# Patient Record
Sex: FEMALE | Race: WHITE | Employment: UNEMPLOYED | URBAN - METROPOLITAN AREA
[De-identification: names, ages, dates, MRNs, and addresses within clinical notes are randomized per-mention and may not be internally consistent; named-entity substitution may affect disease eponyms.]

---

## 2017-06-14 NOTE — PATIENT DISCUSSION
1.  New PVD OS: Patient was cautioned to call our office immediately if they experience a substantial change in their symptoms such as an increase in floaters persistent flashes loss of visual field (may appear as a shadow or a curtain) or decrease in visual acuity as these may indicate a retinal tear or detachment. 2.  Myope-3.    RTN 3-4 weeks DF

## 2017-07-21 NOTE — PATIENT DISCUSSION
1.PVD OS: slightly better. Patient was cautioned to call our office immediately if they experience a substantial change in their symptoms such as an increase in floaters persistent flashes loss of visual field (may appear as a shadow or a curtain) or decrease in visual acuity as these may indicate a retinal tear or detachment. 2.  Myope-3. RTN  prn.  PT wants mrx in future.   4.  RTN 1yr CE

## 2017-08-10 NOTE — PATIENT DISCUSSION
1.  Refractive error- myope-  Rx change. Pt not correctable to 20/20. Pt thinks his right eye has always been weaker. 2.  Cataracts ou-  Undilated.   2.  RTN 6/18 CE

## 2017-08-17 PROBLEM — K85.90 ACUTE PANCREATITIS: Status: ACTIVE | Noted: 2017-08-17

## 2017-10-16 ENCOUNTER — ANESTHESIA (OUTPATIENT)
Dept: ENDOSCOPY | Age: 47
End: 2017-10-16
Payer: OTHER GOVERNMENT

## 2017-10-16 ENCOUNTER — HOSPITAL ENCOUNTER (OUTPATIENT)
Age: 47
Setting detail: OUTPATIENT SURGERY
Discharge: HOME | End: 2017-10-16
Attending: INTERNAL MEDICINE | Admitting: INTERNAL MEDICINE
Payer: OTHER GOVERNMENT

## 2017-10-16 ENCOUNTER — ANESTHESIA EVENT (OUTPATIENT)
Dept: ENDOSCOPY | Age: 47
End: 2017-10-16
Payer: OTHER GOVERNMENT

## 2017-10-16 VITALS
BODY MASS INDEX: 32.78 KG/M2 | HEIGHT: 64 IN | HEART RATE: 89 BPM | SYSTOLIC BLOOD PRESSURE: 104 MMHG | RESPIRATION RATE: 17 BRPM | OXYGEN SATURATION: 99 % | WEIGHT: 192 LBS | DIASTOLIC BLOOD PRESSURE: 65 MMHG | TEMPERATURE: 97.8 F

## 2017-10-16 PROBLEM — R11.2 NAUSEA AND VOMITING: Chronic | Status: ACTIVE | Noted: 2017-10-16

## 2017-10-16 LAB — GLUCOSE BLD STRIP.AUTO-MCNC: 139 MG/DL (ref 70–110)

## 2017-10-16 PROCEDURE — 76060000031 HC ANESTHESIA FIRST 0.5 HR: Performed by: INTERNAL MEDICINE

## 2017-10-16 PROCEDURE — 82962 GLUCOSE BLOOD TEST: CPT

## 2017-10-16 PROCEDURE — 88305 TISSUE EXAM BY PATHOLOGIST: CPT | Performed by: INTERNAL MEDICINE

## 2017-10-16 PROCEDURE — 76040000019: Performed by: INTERNAL MEDICINE

## 2017-10-16 PROCEDURE — 74011250636 HC RX REV CODE- 250/636

## 2017-10-16 PROCEDURE — 77030009426 HC FCPS BIOP ENDOSC BSC -B: Performed by: INTERNAL MEDICINE

## 2017-10-16 RX ORDER — SODIUM CHLORIDE, SODIUM LACTATE, POTASSIUM CHLORIDE, CALCIUM CHLORIDE 600; 310; 30; 20 MG/100ML; MG/100ML; MG/100ML; MG/100ML
INJECTION, SOLUTION INTRAVENOUS
Status: DISCONTINUED | OUTPATIENT
Start: 2017-10-16 | End: 2017-10-16 | Stop reason: HOSPADM

## 2017-10-16 RX ORDER — SODIUM CHLORIDE 0.9 % (FLUSH) 0.9 %
5-10 SYRINGE (ML) INJECTION AS NEEDED
Status: CANCELLED | OUTPATIENT
Start: 2017-10-16 | End: 2017-10-16

## 2017-10-16 RX ORDER — SODIUM CHLORIDE 0.9 % (FLUSH) 0.9 %
5-10 SYRINGE (ML) INJECTION EVERY 8 HOURS
Status: CANCELLED | OUTPATIENT
Start: 2017-10-16 | End: 2017-10-16

## 2017-10-16 RX ORDER — PROPOFOL 10 MG/ML
INJECTION, EMULSION INTRAVENOUS AS NEEDED
Status: DISCONTINUED | OUTPATIENT
Start: 2017-10-16 | End: 2017-10-16 | Stop reason: HOSPADM

## 2017-10-16 RX ORDER — SODIUM CHLORIDE 9 MG/ML
25 INJECTION, SOLUTION INTRAVENOUS CONTINUOUS
Status: CANCELLED | OUTPATIENT
Start: 2017-10-16 | End: 2017-10-16

## 2017-10-16 RX ADMIN — PROPOFOL 80 MG: 10 INJECTION, EMULSION INTRAVENOUS at 08:44

## 2017-10-16 RX ADMIN — PROPOFOL 30 MG: 10 INJECTION, EMULSION INTRAVENOUS at 08:45

## 2017-10-16 RX ADMIN — SODIUM CHLORIDE, SODIUM LACTATE, POTASSIUM CHLORIDE, CALCIUM CHLORIDE: 600; 310; 30; 20 INJECTION, SOLUTION INTRAVENOUS at 08:30

## 2017-10-16 RX ADMIN — PROPOFOL 30 MG: 10 INJECTION, EMULSION INTRAVENOUS at 08:47

## 2017-10-16 NOTE — ANESTHESIA POSTPROCEDURE EVALUATION
Post-Anesthesia Evaluation and Assessment    Patient: Cesilia Harding MRN: [id number]  SSN: [id number]    YOB: 1970  Age: 50 y.o. Sex: female       Cardiovascular Function/Vital Signs  Visit Vitals    BP 92/53    Pulse 96    Temp 36.6 °C (97.8 °F)    Resp 18    Ht 5' 4\" (1.626 m)    Wt 87.1 kg (192 lb)    SpO2 97%    BMI 32.96 kg/m2       Patient is status post MAC anesthesia for Procedure(s):  ESOPHAGOGASTRODUODENOSCOPY (EGD). Nausea/Vomiting: None    Postoperative hydration reviewed and adequate. Pain:  Pain Scale 1: Numeric (0 - 10) (10/16/17 0855)  Pain Intensity 1: 0 (10/16/17 0855)   none    Neurological Status: At baseline    Mental Status and Level of Consciousness: Arousable    Pulmonary Status:   O2 Device: Room air (10/16/17 0858)   Adequate oxygenation and airway patent    Complications related to anesthesia: None    Post-anesthesia assessment completed.  No concerns    Signed By: Bella Lazaro CRNA     October 16, 2017

## 2017-10-16 NOTE — IP AVS SNAPSHOT
[address] [address] Carlota Winters  
[phone] Patient: Veronika Grace MRN: [id number] JANAE:[birth year] You are allergic to the following Allergen Reactions Pcn (Penicillins) Anaphylaxis Biaxin (Clarithromycin) Other (comments) Abdominal pain, dark urine Brethine (Terbutaline) Other (comments) Severe haalucinations Lyrica (Pregabalin) Angioedema Skelaxin (Metaxalone) Other (comments) Makes her severely weak Statins-Hmg-Coa Reductase Inhibitors Other (comments) Pain muscle weakness Recent Documentation Height Weight BMI OB Status Smoking Status 1.626 m 87.1 kg 32.96 kg/m2 Hysterectomy Current Every Day Smoker Emergency Contacts Name Discharge Info Relation Home Work Mobile Juan Alberto Murdock DISCHARGE CAREGIVER [3] Spouse [3]   [phone] About your hospitalization You were admitted on:  October 16, 2017 [facility] You were discharged on:  October 16, 2017 Unit phone number:  [phone] Why you were hospitalized Your primary diagnosis was:  Not on File Your diagnoses also included:  Nausea And Vomiting Providers Seen During Your Hospitalizations Provider Role Specialty Primary office phone Rich Rosado MD Attending Provider Gastroenterology [phone] Your Primary Care Physician (PCP) Primary Care Physician Office Phone Office Fax Antonietta Sicard [phone] Follow-up Information Follow up With Details Comments Contact Info Anurag Wyatt MD   [facility] [address] [address] 
[phone] Juanita La MD In 4 weeks  7 [facility] 
[address] [address] [facility] [address] [phone] Current Discharge Medication List  
  
CONTINUE these medications which have NOT CHANGED Dose & Instructions Dispensing Information Comments Morning Noon Evening Bedtime  
 albuterol 2.5 mg /3 mL (0.083 %) nebulizer solution Commonly known as:  PROVENTIL VENTOLIN Your last dose was: Your next dose is: Take  by inhalation every four (4) hours as needed for Wheezing. Refills:  0  
     
   
   
   
  
 amitriptyline 25 mg tablet Commonly known as:  ELAVIL Your last dose was: Your next dose is:    
   
   
 Dose:  75 mg Take 75 mg by mouth nightly. Refills:  0  
     
   
   
   
  
 aspirin delayed-release 81 mg tablet Your last dose was: Your next dose is:    
   
   
 Dose:  81 mg Take 81 mg by mouth daily. Refills:  0  
     
   
   
   
  
 baclofen 10 mg tablet Commonly known as:  LIORESAL Your last dose was: Your next dose is:    
   
   
 Dose:  10 mg Take 10 mg by mouth three (3) times daily. Refills:  0  
     
   
   
   
  
 benzonatate 200 mg capsule Commonly known as:  TESSALON Your last dose was: Your next dose is:    
   
   
 Dose:  200 mg Take 200 mg by mouth three (3) times daily as needed for Cough. Refills:  0  
     
   
   
   
  
 biotin 2,500 mcg Tab Your last dose was: Your next dose is:    
   
   
 Dose:  40420 mcg Take 10,000 mcg by mouth two (2) times a day. Refills:  0  
     
   
   
   
  
 calcium-magnesium-zinc 333-133-5 mg Tab Your last dose was: Your next dose is: Take  by mouth two (2) times a day. Refills:  0  
     
   
   
   
  
 cholecalciferol (vitamin D3) 2,000 unit Tab Your last dose was: Your next dose is:    
   
   
 Dose:  2000 Int'l Units Take 2,000 Int'l Units by mouth two (2) times a day. Refills:  0  
     
   
   
   
  
 cinnamon bark 500 mg Cap Your last dose was:     
   
Your next dose is:    
   
   
 Dose:  1000 mg  
 Take 1,000 mg by mouth two (2) times a day. Refills:  0  
     
   
   
   
  
 clonazePAM 0.5 mg tablet Commonly known as:  Kelly Cole Your last dose was: Your next dose is:    
   
   
 Dose:  0.5 mg Take 0.5 mg by mouth three (3) times daily. Refills:  0  
     
   
   
   
  
 cranberry extract 450 mg Tab tablet Your last dose was: Your next dose is:    
   
   
 Dose:  8400 mg Take 8,400 mg by mouth two (2) times a day. Refills:  0  
     
   
   
   
  
 cyanocobalamin 1,000 mcg tablet Your last dose was: Your next dose is:    
   
   
 Dose:  1000 mcg Take 1,000 mcg by mouth daily. Refills:  0  
     
   
   
   
  
 famotidine 20 mg tablet Commonly known as:  PEPCID Your last dose was: Your next dose is:    
   
   
 Dose:  20 mg Take 20 mg by mouth two (2) times a day. Refills:  0 HumaLOG 100 unit/mL injection Generic drug:  insulin lispro Your last dose was: Your next dose is:    
   
   
 Dose:  5 Units 5 Units by SubCUTAneous route Before breakfast, lunch, and dinner. Indications: Gestational Diabetes Mellitus, for \"high blood sugar\" Refills:  0 HYDROcodone-acetaminophen  mg tablet Commonly known as:  Julaine Kava Your last dose was: Your next dose is:    
   
   
 Dose:  1 Tab Take 1 Tab by mouth every four (4) hours as needed. Max Daily Amount: 6 Tabs. Quantity:  30 Tab Refills:  0  
     
   
   
   
  
 LANTUS 100 unit/mL injection Generic drug:  insulin glargine Your last dose was: Your next dose is:    
   
   
 Dose:  20 Units 20 Units by SubCUTAneous route daily. Refills:  0  
     
   
   
   
  
 levothyroxine 75 mcg tablet Commonly known as:  SYNTHROID Your last dose was: Your next dose is: Take  by mouth Daily (before breakfast). Refills:  0 metFORMIN 500 mg tablet Commonly known as:  GLUCOPHAGE Your last dose was: Your next dose is:    
   
   
 Dose:  1000 mg Take 1,000 mg by mouth two (2) times daily (with meals). Refills:  0  
     
   
   
   
  
 montelukast 10 mg tablet Commonly known as:  SINGULAIR Your last dose was: Your next dose is:    
   
   
 Dose:  10 mg Take 10 mg by mouth daily. Refills:  0  
     
   
   
   
  
 perindopril 4 mg tablet Commonly known as:  Jacob Caulk Your last dose was: Your next dose is:    
   
   
 Dose:  4 mg Take 4 mg by mouth daily. Indications: HYPERTENSION Refills:  0  
     
   
   
   
  
 promethazine 25 mg tablet Commonly known as:  PHENERGAN Your last dose was: Your next dose is:    
   
   
 Dose:  25 mg Take 1 Tab by mouth every six (6) hours as needed for Nausea. Quantity:  25 Tab Refills:  1  
     
   
   
   
  
 pyridoxine (vitamin B6) 100 mg tablet Commonly known as:  VITAMIN B-6 Your last dose was: Your next dose is:    
   
   
 Dose:  100 mg Take 100 mg by mouth daily. Refills:  0  
     
   
   
   
  
 tiotropium 18 mcg inhalation capsule Commonly known as:  Hyacinth Duyen Your last dose was: Your next dose is:    
   
   
 Dose:  1 Cap Take 1 Cap by inhalation daily. Refills:  0 Venlafaxine 150 mg Tr24 Your last dose was: Your next dose is: Take  by mouth. Refills:  0 Discharge Instructions EGD DISCHARGE INSTRUCTIONS You have just had an examination of your esophagus (swallowing tube), stomach and duodenum (the first part of your small intestine) and of your colon (large intestine). The medication given to you during your procedure will be acting in your body for the next 12 hours, gradually wearing off.   Your face may be flushed, skin may be warm and sweaty, you might feel a little bloated or nauseated and sleepy. 1. For the next 12 hours you SHOULD NOT: 
 
a. Drive, operate any machinery. b. Drink alcohol. 
c. Engage in activities that require mental sharpness or manual dexterity such as cooking. d. Take any medications other than prescribed by a physician. 
e. Make any legal or financial decisions. 2. Call this office immediately, if: 
 
a. Onset of new or increase of elieser rectal bleeding. 
b. Fever > 101 
c. Increased abdominal pain Additional instructions: 
 
a. Diet as recommended 
b. Due to risk of dehydration after colon prep and sedation, avoid extended periods of time outdoors if the day is hot and humid. c. If biopsies were taken, you will receive a post card or telephone call with results of your biopsies and suggestion for your next colonoscopy. Please allow us at least 3 weeks to get back with you about your results. If we have not contacted you by the, please call us for results. # [phone] 
d. If you had polyp(s) removed DO NOT TAKE NSAIDS (Aspirin, Advil, Aleve, Naproxyn, Ibuprofen, etc) for 2 weeks. Tylenol is OK to use. DISCHARGE SUMMARY from Nurse The following personal items are in your possession at time of discharge: 
 
Dental Appliances: Uppers Visual Aid: None PATIENT INSTRUCTIONS: 
 
After general anesthesia or intravenous sedation, for 24 hours or while taking prescription Narcotics: · Limit your activities · Do not drive and operate hazardous machinery · Do not make important personal or business decisions · Do  not drink alcoholic beverages · If you have not urinated within 8 hours after discharge, please contact your surgeon on call. Report the following to your surgeon: 
· Excessive pain, swelling, redness or odor of or around the surgical area · Temperature over 100.5 · Nausea and vomiting lasting longer than 4 hours or if unable to take medications · Any signs of decreased circulation or nerve impairment to extremity: change in color, persistent  numbness, tingling, coldness or increase pain · Any questions What to do at Home: These are general instructions for a healthy lifestyle: No smoking/ No tobacco products/ Avoid exposure to second hand smoke Surgeon General's Warning:  Quitting smoking now greatly reduces serious risk to your health. Obesity, smoking, and sedentary lifestyle greatly increases your risk for illness A healthy diet, regular physical exercise & weight monitoring are important for maintaining a healthy lifestyle You may be retaining fluid if you have a history of heart failure or if you experience any of the following symptoms:  Weight gain of 3 pounds or more overnight or 5 pounds in a week, increased swelling in our hands or feet or shortness of breath while lying flat in bed. Please call your doctor as soon as you notice any of these symptoms; do not wait until your next office visit. Recognize signs and symptoms of STROKE: 
 
F-face looks uneven A-arms unable to move or move unevenly S-speech slurred or non-existent T-time-call 911 as soon as signs and symptoms begin-DO NOT go Back to bed or wait to see if you get better-TIME IS BRAIN. Warning Signs of HEART ATTACK Call 911 if you have these symptoms: 
? Chest discomfort. Most heart attacks involve discomfort in the center of the chest that lasts more than a few minutes, or that goes away and comes back. It can feel like uncomfortable pressure, squeezing, fullness, or pain. ? Discomfort in other areas of the upper body. Symptoms can include pain or discomfort in one or both arms, the back, neck, jaw, or stomach. ? Shortness of breath with or without chest discomfort. ? Other signs may include breaking out in a cold sweat, nausea, or lightheadedness. Don't wait more than five minutes to call [address]!  Fast action can save your life. Calling 911 is almost always the fastest way to get lifesaving treatment. Emergency Medical Services staff can begin treatment when they arrive  up to an hour sooner than if someone gets to the hospital by car. The discharge information has been reviewed with the patient. The patient verbalized understanding. Discharge medications reviewed with the patient and appropriate educational materials and side effects teaching were provided. Patient armband removed and given to patient to take home. Patient was informed of the privacy risks if armband lost or stolen Discharge Orders None Introducing [facility]! [employer] introduces [employer] patient portal. Now you can access parts of your medical record, email your doctor's office, and request medication refills online. 1. In your internet browser, go to [email or web address] 2. Click on the First Time User? Click Here link in the Sign In box. You will see the New Member Sign Up page. 3. Enter your [employer] Access Code exactly as it appears below. You will not need to use this code after youve completed the sign-up process. If you do not sign up before the expiration date, you must request a new code. · [employer] Access Code: [email or web address] Expires: 11/19/2017 11:00 AM 
 
4. Enter the last four digits of your Social Security Number (xxxx) and Date of Birth (mm/dd/yyyy) as indicated and click Submit. You will be taken to the next sign-up page. 5. Create a [employer] ID. This will be your [employer] login ID and cannot be changed, so think of one that is secure and easy to remember. 6. Create a [employer] password. You can change your password at any time. 7. Enter your Password Reset Question and Answer. This can be used at a later time if you forget your password. 8. Enter your e-mail address. You will receive e-mail notification when new information is available in [address] 9. Click Sign Up. You can now view and download portions of your medical record. 10. Click the Download Summary menu link to download a portable copy of your medical information. If you have questions, please visit the Frequently Asked Questions section of the [employer] website. Remember, [employer] is NOT to be used for urgent needs. For medical emergencies, dial 911. Now available from your iPhone and Android! General Information Please provide this summary of care documentation to your next provider. Patient Signature:  ____________________________________________________________ Date:  ____________________________________________________________  
  
José Sport Provider Signature:  ____________________________________________________________ Date:  ____________________________________________________________

## 2017-10-16 NOTE — H&P
Date of Surgery Update:  Gustavo Justices was seen and examined. History and physical has been reviewed. The patient has been examined.  There have been no significant clinical changes since the completion of the originally dated History and Physical.    Signed By: Sharda Weiner MD     October 16, 2017 8:22 AM

## 2017-10-16 NOTE — DISCHARGE INSTRUCTIONS
EGD DISCHARGE INSTRUCTIONS    You have just had an examination of your esophagus (swallowing tube), stomach and duodenum (the first part of your small intestine) and of your colon (large intestine). The medication given to you during your procedure will be acting in your body for the next 12 hours, gradually wearing off. Your face may be flushed, skin may be warm and sweaty, you might feel a little bloated or nauseated and sleepy. 1. For the next 12 hours you SHOULD NOT:    a. Drive, operate any machinery. b. Drink alcohol.  c. Engage in activities that require mental sharpness or manual dexterity such as cooking. d. Take any medications other than prescribed by a physician.  e. Make any legal or financial decisions. 2. Call this office immediately, if:    a. Onset of new or increase of elieser rectal bleeding.  b. Fever > 101  c. Increased abdominal pain    Additional instructions:    a. Diet as recommended  b. Due to risk of dehydration after colon prep and sedation, avoid extended periods of time outdoors if the day is hot and humid. c. If biopsies were taken, you will receive a post card or telephone call with results of your biopsies and suggestion for your next colonoscopy. Please allow us at least 3 weeks to get back with you about your results. If we have not contacted you by the, please call us for results. Ph# [phone]  d. If you had polyp(s) removed DO NOT TAKE NSAIDS (Aspirin, Advil, Aleve, Naproxyn, Ibuprofen, etc) for 2 weeks. Tylenol is OK to use.         DISCHARGE SUMMARY from Nurse    The following personal items are in your possession at time of discharge:    Dental Appliances: Uppers  Visual Aid: None                            PATIENT INSTRUCTIONS:    After general anesthesia or intravenous sedation, for 24 hours or while taking prescription Narcotics:  · Limit your activities  · Do not drive and operate hazardous machinery  · Do not make important personal or business decisions  · Do  not drink alcoholic beverages  · If you have not urinated within 8 hours after discharge, please contact your surgeon on call. Report the following to your surgeon:  · Excessive pain, swelling, redness or odor of or around the surgical area  · Temperature over 100.5  · Nausea and vomiting lasting longer than 4 hours or if unable to take medications  · Any signs of decreased circulation or nerve impairment to extremity: change in color, persistent  numbness, tingling, coldness or increase pain  · Any questions        What to do at Home:  These are general instructions for a healthy lifestyle:    No smoking/ No tobacco products/ Avoid exposure to second hand smoke    Surgeon General's Warning:  Quitting smoking now greatly reduces serious risk to your health. Obesity, smoking, and sedentary lifestyle greatly increases your risk for illness    A healthy diet, regular physical exercise & weight monitoring are important for maintaining a healthy lifestyle    You may be retaining fluid if you have a history of heart failure or if you experience any of the following symptoms:  Weight gain of 3 pounds or more overnight or 5 pounds in a week, increased swelling in our hands or feet or shortness of breath while lying flat in bed. Please call your doctor as soon as you notice any of these symptoms; do not wait until your next office visit. Recognize signs and symptoms of STROKE:    F-face looks uneven    A-arms unable to move or move unevenly    S-speech slurred or non-existent    T-time-call 911 as soon as signs and symptoms begin-DO NOT go       Back to bed or wait to see if you get better-TIME IS BRAIN. Warning Signs of HEART ATTACK     Call 911 if you have these symptoms:   Chest discomfort. Most heart attacks involve discomfort in the center of the chest that lasts more than a few minutes, or that goes away and comes back.  It can feel like uncomfortable pressure, squeezing, fullness, or pain.  Discomfort in other areas of the upper body. Symptoms can include pain or discomfort in one or both arms, the back, neck, jaw, or stomach.  Shortness of breath with or without chest discomfort.  Other signs may include breaking out in a cold sweat, nausea, or lightheadedness. Don't wait more than five minutes to call 911 - MINUTES MATTER! Fast action can save your life. Calling 911 is almost always the fastest way to get lifesaving treatment. Emergency Medical Services staff can begin treatment when they arrive -- up to an hour sooner than if someone gets to the hospital by car. The discharge information has been reviewed with the patient. The patient verbalized understanding. Discharge medications reviewed with the patient and appropriate educational materials and side effects teaching were provided. Patient armband removed and given to patient to take home.   Patient was informed of the privacy risks if armband lost or stolen

## 2017-10-16 NOTE — PROCEDURES
EGD Procedure Note    Patient: Cesilia Harding MRN: [id number]  SSN: [id number]    YOB: 1970  Age: 50 y.o. Sex: female      Date of Procedure: 10/16/2017      Procedures:  EGD :    HISTORY UPDATE: History and physical has been reviewed. The patient has been examined. There have been no significant clinical changes since the completion of the originally dated History and Physical.    INDICATION: Nausea, vomiting    PROCEDURE PERFORMED: EGD    ENDOSCOPIST: Roopa Hanson MD    ASSISTANT:  Endoscopy Technician-1: Angelica Callahan  Endoscopy RN-1: Edson Rothman RN    CLASSIFICATION OF PREOPERATIVE RISK: ASA 2 - Patient with mild systemic disease with no functional limitations    ANESTHESIA:  MAC anesthesia    ENDOSCOPE: [id number]                                                                                                              EXTENT OF EXAM: Second portion of the duodenum      DESCRIPTION OF PROCEDURE:   The procedure was discussed with the patient including purpose, risks, benefits and alternatives including but not limited to IV conscious sedation, bleeding, perforation and aspiration and the consent form was signed and witnessed. A safety timeout was performed. Procedure done with MAC. The patients vital signs were monitored at all times including heart rate and rhythm, oxygen saturation, and blood pressure. The patient was then placed into the left lateral decubitus position. The Olympus adult diagnostic endoscope was then passed under direct visualization to the second portion of the duodenum. The endoscope was then slowly withdrawn while closely visualizing the mucosa. In the stomach a retroflexion was performed and gastric fundus and cardia visualized. The scope was then removed. The patient was then transferred to the recovery room. FINDINGS:   Esophagus: The esophageal mucosa was normal with no ulceration, mass or stricture.   There was no evidence of Travis's esophagus or reflux esophagitis. Z line at 38 cm. Stomach: The gastric mucosa showed  no ulceration, mass, stricture. Diffuse gastritis seen with some bile reflux, biopsies done to r/o H. Pylori. Retroflexion revealed a normal cardia and fundus      Duodenum: The duodenum mucosa was normal with no ulceration, mass,  stricture and no evidence of villous atrophy. EBL: None      SPECIMENS:   ID Type Source Tests Collected by Time Destination   1 : bx gastric r/o Hpylori Preservative Gastric  Loura Gibran La MD 10/16/2017 4881 Pathology       IMPRESSION: Diffuse gastritis seen with some bile reflux, biopsies done to r/o H. Pylori    PLAN 1: Discharge when sedation criteria are met. 2.  Resume regular Diet as tolerated. 3. Further recommendations will be made based on biopsy results.  4. Avoid NSAID use     Follow Up:  As scheduled     Meg Ziegler MD  10/16/2017

## 2017-10-16 NOTE — ANESTHESIA PREPROCEDURE EVALUATION
Anesthetic History   No history of anesthetic complications            Review of Systems / Medical History  Patient summary reviewed and pertinent labs reviewed    Pulmonary    COPD      Smoker  Asthma : well controlled       Neuro/Psych   Within defined limits           Cardiovascular    Hypertension: well controlled              Exercise tolerance: >4 METS  Comments: Aneurysm between chambers of the heart   GI/Hepatic/Renal  Within defined limits              Endo/Other    Diabetes: type 2, using insulin  Hypothyroidism       Other Findings   Comments:   Risk Factors for Postoperative nausea/vomiting:       History of postoperative nausea/vomiting? NO       Female? YES       Motion sickness? NO       Intended opioid administration for postoperative analgesia? NO      Smoking Abstinence  Current Smoker? YES  Elective Surgery? YES  Seen preoperatively by anesthesiologist or proxy prior to day of surgery? YES  Pt abstained from smoking 24 hours prior to anesthesia?  NO           Physical Exam    Airway  Mallampati: II  TM Distance: 4 - 6 cm  Neck ROM: normal range of motion   Mouth opening: Normal     Cardiovascular  Regular rate and rhythm,  S1 and S2 normal,  no murmur, click, rub, or gallop  Rhythm: regular  Rate: normal         Dental    Dentition: Poor dentition     Pulmonary  Breath sounds clear to auscultation               Abdominal  GI exam deferred       Other Findings            Anesthetic Plan    ASA: 3  Anesthesia type: MAC          Induction: Intravenous  Anesthetic plan and risks discussed with: Patient

## 2018-08-02 PROBLEM — E11.3293: Noted: 2018-08-02

## 2018-08-02 PROBLEM — H25.13: Noted: 2020-01-14

## 2018-08-02 PROBLEM — H04.123: Noted: 2020-01-14

## 2019-11-07 ENCOUNTER — IMPORTED ENCOUNTER (OUTPATIENT)
Dept: URBAN - NONMETROPOLITAN AREA CLINIC 1 | Facility: CLINIC | Age: 49
End: 2019-11-07

## 2019-11-07 PROCEDURE — 92014 COMPRE OPH EXAM EST PT 1/>: CPT

## 2019-11-07 PROCEDURE — 92134 CPTRZ OPH DX IMG PST SGM RTA: CPT

## 2019-11-07 PROCEDURE — 92015 DETERMINE REFRACTIVE STATE: CPT

## 2019-11-07 NOTE — PATIENT DISCUSSION
*Diabetic  with mild NPDR:.-  Discussed findings of exam in detail with the patient. -  discussed the risk of diabetic damage of the retina with potential vision loss and the importance of routine follow-up. oct today/stable ouAstigmatism-Discussed diagnosis with patient. Presbyopia-Discussed diagnosis with patient. Updated spec Rx given. Recommend lens that will provide comfort as well as protect safety and health of eyes.

## 2020-01-14 ENCOUNTER — IMPORTED ENCOUNTER (OUTPATIENT)
Dept: URBAN - NONMETROPOLITAN AREA CLINIC 1 | Facility: CLINIC | Age: 50
End: 2020-01-14

## 2020-01-14 PROCEDURE — 92014 COMPRE OPH EXAM EST PT 1/>: CPT

## 2020-01-14 NOTE — PATIENT DISCUSSION
*Diabetic  with mild NPDR:.-  Discussed findings of exam in detail with the patient. -  discussed the risk of diabetic damage of the retina with potential vision loss and the importance of routine follow-up. Cataract OU-Not yet surgical. -Reviewed symptoms of advancing cataract growth such as glare and halos and decreased vision.-Continue to monitor for now. Pt will notify us if any new symptoms develop. CONSIDER REFERRAL IF NO IMPROVEMENT ON F/UDES-Explained LYNNE and associated symptoms.-Recommend increasing Omega 3s.-Pt to begin artificial tears OU QID PRN. Pt will contact us if this does not provide relief. Consider punctal plugs in that case. START PF BID OU RTC 1MON F/U AND RE-REFRACT DUE TO LARGE CHANGE IN RX TODAY

## 2020-01-20 ENCOUNTER — IMPORTED ENCOUNTER (OUTPATIENT)
Dept: URBAN - NONMETROPOLITAN AREA CLINIC 1 | Facility: CLINIC | Age: 50
End: 2020-01-20

## 2020-01-20 PROCEDURE — 92014 COMPRE OPH EXAM EST PT 1/>: CPT

## 2020-01-20 NOTE — PATIENT DISCUSSION
*Diabetic  with mild NPDR:.-  Discussed findings of exam in detail with the patient. -  discussed the risk of diabetic damage of the retina with potential vision loss and the importance of routine follow-up. LYNNE-Explained LYNNE and associated symptoms.-Recommend increasing Omega 3s.-Pt to begin artificial tears OU QID PRN. Pt will contact us if this does not provide relief. Consider punctal plugs in that case. START PF BID OU Cataract(s)-Visually significant.-Cataract(s) causing symptomatic impairment of visual function not correctable with a tolerable change in glasses or contact lenses lighting or non-operative means resulting in specific activity limitations and/or participation restrictions including but not limited to reading viewing television driving or meeting vocational or recreational needs. -Expectation is clearer vision and reduced glare disability after cataract removal.-Refer to Dr Salma Fernandez at [facility] for cat eval

## 2020-02-17 ENCOUNTER — IMPORTED ENCOUNTER (OUTPATIENT)
Dept: URBAN - NONMETROPOLITAN AREA CLINIC 1 | Facility: CLINIC | Age: 50
End: 2020-02-17

## 2020-02-17 PROBLEM — E11.3293: Noted: 2020-02-17

## 2020-02-17 PROBLEM — H25.13: Noted: 2020-02-17

## 2020-02-17 PROBLEM — H04.123: Noted: 2020-01-14

## 2020-02-17 PROCEDURE — 92014 COMPRE OPH EXAM EST PT 1/>: CPT

## 2020-02-17 PROCEDURE — 92015 DETERMINE REFRACTIVE STATE: CPT

## 2020-02-17 NOTE — PATIENT DISCUSSION
Cataract OU-Not yet surgical. -Reviewed symptoms of advancing cataract growth such as glare and halos and decreased vision.-Continue to monitor for now. Pt will notify us if any new symptoms develop. DR. David Fernandez REFERRED TO [facility]URO FOR CONSULT

## 2021-03-05 ENCOUNTER — IMPORTED ENCOUNTER (OUTPATIENT)
Dept: URBAN - NONMETROPOLITAN AREA CLINIC 1 | Facility: CLINIC | Age: 51
End: 2021-03-05

## 2021-03-05 PROCEDURE — 92015 DETERMINE REFRACTIVE STATE: CPT

## 2021-03-05 PROCEDURE — 92014 COMPRE OPH EXAM EST PT 1/>: CPT

## 2021-03-05 NOTE — PATIENT DISCUSSION
Cataract OU-Not yet surgical. -Reviewed symptoms of advancing cataract growth such as glare and halos and decreased vision.-Continue to monitor for now. Pt will notify us if any new symptoms develop. DM s -Stressed the importance of keeping blood sugars under control blood pressure under control and weight normalization and regular visits with PCP. -Explained the possible effects of poorly controlled diabetes and the damage that diabetes can cause to ocular health. -Patient to check HgbA1C.-Pt instructed to contact our office with any vision changes. LYNNE-Explained LYNNE and associated symptoms.-Recommend increasing Omega 3s.-Pt to begin artificial tears OU QID PRN. Pt will contact us if this does not provide relief. Consider punctal plugs in that case.

## 2021-06-17 NOTE — PATIENT DISCUSSION
Toric IOL was discussed as needed for full correction of astigmatism. Discussed positioning and rotational stability, along with possibility of requiring rotation of the lens.

## 2021-06-17 NOTE — PATIENT DISCUSSION
The patient feels that the cataract is significantly impacting daily activities and has elected cataract surgery. The risks, benefits, and alternatives to surgery were discussed. The patient elects to proceed with surgery. Schedule A scan.

## 2021-07-15 NOTE — PATIENT DISCUSSION
The types of intraocular lenses were reviewed with the patient along with a discussion of their various strengths and weaknesses. 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 Recommend TORIC IOLs.

## 2021-07-15 NOTE — PATIENT DISCUSSION
I called and discussed in detail.    Urine culture did show infection so sent in prescription for amox.    Follow up with the ob provider.     Other labs are okay.  Mildly anemic but now on prenatal vitamin.    Ben Aponte MD     The risks, benefits and alternatives of cataract surgery were discussed with the patient. Risks including but not limited to: Infection, retinal detachment, lens dislocation, inflammation, loss of vision, increased pressure and need for further surgery. We discussed all the lens options including monofocal lens, toric lens, multifocal lens, astigmatism correction and other options. The patient understands that they may need glasses for optimal vision with any option.

## 2021-07-15 NOTE — PATIENT DISCUSSION
The types of intraocular lenses were reviewed with the patient along with a discussion of their various strengths and weaknesses. MFIOLs not recommended due to cornea.

## 2021-10-04 NOTE — PATIENT DISCUSSION
Toric IOL was discussed as needed for full correction of astigmatism. Discussed positioning and rotational stability, along with possibility of requiring rotation of the lens. Consent (Near Eyelid Margin)/Introductory Paragraph: The rationale for Mohs was explained to the patient and consent was obtained. The risks, benefits and alternatives to therapy were discussed in detail. Specifically, the risks of ectropion or eyelid deformity, infection, scarring, bleeding, prolonged wound healing, incomplete removal, allergy to anesthesia, nerve injury and recurrence were addressed. Prior to the procedure, the treatment site was clearly identified and confirmed by the patient. All components of Universal Protocol/PAUSE Rule completed.

## 2021-10-19 NOTE — PATIENT DISCUSSION
10/19/21: [address] BID AND PROLENSA. CPM BY  [facility]. CME HAS RESOLVED, ONLY SUBCLINICAL APPEARANCE ON FA. Recommend TO CONT TOPICAL NON-STEROID ANTI-INFLAMMATORY therapy. F/U IN 1 MO OR SOONER PRN.

## 2021-11-12 ENCOUNTER — OFFICE VISIT (OUTPATIENT)
Dept: ORTHOPEDIC SURGERY | Age: 51
End: 2021-11-12
Payer: OTHER GOVERNMENT

## 2021-11-12 VITALS — BODY MASS INDEX: 36.32 KG/M2 | OXYGEN SATURATION: 96 % | HEIGHT: 63 IN | WEIGHT: 205 LBS | HEART RATE: 111 BPM

## 2021-11-12 DIAGNOSIS — G89.29 CHRONIC PAIN OF RIGHT KNEE: ICD-10-CM

## 2021-11-12 DIAGNOSIS — M84.451A SUBCHONDRAL INSUFFICIENCY FRACTURE OF CONDYLE OF RIGHT FEMUR, INITIAL ENCOUNTER (HCC): ICD-10-CM

## 2021-11-12 DIAGNOSIS — M25.561 CHRONIC PAIN OF RIGHT KNEE: ICD-10-CM

## 2021-11-12 DIAGNOSIS — M17.11 PRIMARY OSTEOARTHRITIS OF RIGHT KNEE: Primary | ICD-10-CM

## 2021-11-12 PROCEDURE — 20610 DRAIN/INJ JOINT/BURSA W/O US: CPT | Performed by: SPECIALIST

## 2021-11-12 PROCEDURE — 99213 OFFICE O/P EST LOW 20 MIN: CPT | Performed by: SPECIALIST

## 2021-11-12 RX ORDER — BETAMETHASONE SODIUM PHOSPHATE AND BETAMETHASONE ACETATE 3; 3 MG/ML; MG/ML
3 INJECTION, SUSPENSION INTRA-ARTICULAR; INTRALESIONAL; INTRAMUSCULAR; SOFT TISSUE ONCE
Status: COMPLETED | OUTPATIENT
Start: 2021-11-12 | End: 2021-11-12

## 2021-11-12 RX ADMIN — BETAMETHASONE SODIUM PHOSPHATE AND BETAMETHASONE ACETATE 3 MG: 3; 3 INJECTION, SUSPENSION INTRA-ARTICULAR; INTRALESIONAL; INTRAMUSCULAR; SOFT TISSUE at 15:37

## 2021-11-12 NOTE — PROGRESS NOTES
Patient: Cherylene Severs                MRN: [id number]       SSN: [id number]  YOB: 1970        AGE: 46 y.o. SEX: female    PCP: Pastor Rigoberto MD  11/12/21    CC: BILATERAL KNEE PAIN R>L    HISTORY:  Cherylene Severs is a 46 y.o. female who is seen for mostly right knee pain. She was previously seen by Dr. Watson and Dr. Waqar Burciaga. She had a MRI but she says Dr. Waqar Burciaga never called her about the results and told her there was nothing wrong with her knee. She now presents for a second orthopedic opinion. She has been experiencing right knee pain for the past several months. She felt like her knee buckled on her last night which caused her to fall to the ground. She feels excruciating pain when she tries bearing weight. She feels pain with standing, walking and stair climbing. She experiences startup pain after sitting. She say's she can't walk due to her pain and Parkinson's disease. She has a h/o Parkinson's. She says she has trouble maintaining her balance. Pain Assessment  11/12/2021   Location of Pain Knee   Location Modifiers Right   Severity of Pain 10   Quality of Pain Aching; Sharp;Burning   Frequency of Pain Constant   Date Pain First Started (No Data)   Date Pain First Started Comment 9/21   Aggravating Factors Bending;Stretching;Straightening;Walking;Standing   Limiting Behavior Yes   Relieving Factors Elevation; Ice   Result of Injury No     Occupation, etc:  Ms. Joshua Clemons is not employed. She is a housewife. She worked a  and  a long time ago. She has to take care of her grandson. She has to drive him 40 miles to his private school in [place] everyday. She lives with her  in [place]. She has 3 daughters and 4 grandsons (4-5 yo). Ms. Joshua Clemons weighs 205 lbs and is 5'3\" tall. She has lost 100 pounds on diabetic treatment. She is an insulin dependent diabetic.     Lab Results   Component Value Date/Time    Hemoglobin A1c 7.9 (H) 05/15/2018 07:19 PM     Weight Metrics 11/12/2021 6/24/2019 12/28/2018 10/16/2017 8/17/2017   Weight 205 lb 205 lb 205 lb 192 lb 200 lb   BMI 36.31 kg/m2 36.31 kg/m2 36.31 kg/m2 32.96 kg/m2 35.43 kg/m2       Patient Active Problem List   Diagnosis Code    Acute pancreatitis K85.90    Nausea and vomiting R11.2     REVIEW OF SYSTEMS:    Constitutional Symptoms: Negative   Eyes: Negative   Ears, Nose, Throat and Mouth: Negative   Cardiovascular: Negative   Respiratory: Negative   Genitourinary: Per HPI   Gastrointestinal: Per HPI   Integumentary (Skin and/or Breast): Negative   Musculoskeletal: Per HPI   Endocrine/Rheumatologic: Negative   Neurological: Per HPI   Hematology/Lymphatic: Negative    Allergic/Immunologic: Negative   Phychiatric: Negative    Social History     Socioeconomic History    Marital status:      Spouse name: Not on file    Number of children: Not on file    Years of education: Not on file    Highest education level: Not on file   Occupational History    Not on file   Tobacco Use    Smoking status: Current Every Day Smoker     Packs/day: 0.50    Smokeless tobacco: Never Used   Substance and Sexual Activity    Alcohol use: No    Drug use: No    Sexual activity: Not on file   Other Topics Concern    Not on file   Social History Narrative    Not on file     Social Determinants of Health     Financial Resource Strain:     Difficulty of Paying Living Expenses: Not on file   Food Insecurity:     Worried About Running Out of Food in the Last Year: Not on file    Gab of Food in the Last Year: Not on file   Transportation Needs:     Lack of Transportation (Medical): Not on file    Lack of Transportation (Non-Medical):  Not on file   Physical Activity:     Days of Exercise per Week: Not on file    Minutes of Exercise per Session: Not on file   Stress:     Feeling of Stress : Not on file   Social Connections:     Frequency of Communication with Friends and Family: Not on file    Frequency of Social Gatherings with Friends and Family: Not on file    Attends Protestant Services: Not on file    Active Member of Clubs or Organizations: Not on file    Attends Club or Organization Meetings: Not on file    Marital Status: Not on file   Intimate Partner Violence:     Fear of Current or Ex-Partner: Not on file    Emotionally Abused: Not on file    Physically Abused: Not on file    Sexually Abused: Not on file   Housing Stability:     Unable to Pay for Housing in the Last Year: Not on file    Number of [place] in the Last Year: Not on file    Unstable Housing in the Last Year: Not on file      Allergies   Allergen Reactions    Pcn [Penicillins] Anaphylaxis    Biaxin [Clarithromycin] Other (comments)     Abdominal pain, dark urine    Brethine [Terbutaline] Other (comments)     Severe haalucinations    Gabapentin Other (comments)     Sates made nerve pain excruciating        Lyrica [Pregabalin] Angioedema    Skelaxin [Metaxalone] Other (comments)     Makes her severely weak      Statins-Hmg-Coa Reductase Inhibitors Other (comments)     Pain muscle weakness        Current Outpatient Medications   Medication Sig    omeprazole (PRILOSEC) 40 mg capsule Take 40 mg by mouth daily.  dicyclomine (BENTYL) 10 mg capsule Take 10 mg by mouth 4 times daily (before meals and nightly).  pramipexole (MIRAPEX) 0.25 mg tablet Take 0.5 mg by mouth three (3) times daily.  traZODone (DESYREL) 50 mg tablet Take 100 mg by mouth nightly.  valACYclovir (Valtrex) 1 gram tablet Take  by mouth.  augmented betamethasone dipropionate (DIPROLENE-AF) 0.05 % ointment Apply  to affected area two (2) times a day.  sucralfate (Carafate) 100 mg/mL suspension Take 5 mL by mouth four (4) times daily.  methocarbamoL (Robaxin) 500 mg tablet Take 1 Tab by mouth four (4) times daily.  As needed for muscle pain or spasm    promethazine (PHENERGAN) 25 mg tablet Take 1 Tab by mouth every six (6) hours as needed for Nausea for up to 12 doses.  hydroxychloroquine (PLAQUENIL) 200 mg tablet Take 200 mg by mouth two (2) times a day.  insulin glargine (LANTUS) 100 unit/mL injection 30 Units by SubCUTAneous route daily.  insulin lispro (HUMALOG) 100 unit/mL injection 5 Units by SubCUTAneous route Before breakfast, lunch, and dinner. Indications: Gestational Diabetes Mellitus, for \"high blood sugar\"    metFORMIN (GLUCOPHAGE) 500 mg tablet Take 1,000 mg by mouth two (2) times daily (with meals).  levothyroxine (SYNTHROID) 75 mcg tablet Take  by mouth Daily (before breakfast).  perindopril (ACEON) 4 mg tablet Take 4 mg by mouth daily. Indications: HYPERTENSION    amitriptyline (ELAVIL) 25 mg tablet Take 75 mg by mouth nightly.  montelukast (SINGULAIR) 10 mg tablet Take 10 mg by mouth daily.  baclofen (LIORESAL) 10 mg tablet Take 10 mg by mouth three (3) times daily.  Venlafaxine 150 mg tr24 Take  by mouth.  benzonatate (TESSALON) 200 mg capsule Take 200 mg by mouth three (3) times daily as needed for Cough.  tiotropium (SPIRIVA) 18 mcg inhalation capsule Take 1 Cap by inhalation daily.  albuterol (PROVENTIL VENTOLIN) 2.5 mg /3 mL (0.083 %) nebulizer solution Take  by inhalation every four (4) hours as needed for Wheezing.  clonazePAM (KLONOPIN) 0.5 mg tablet Take 0.5 mg by mouth three (3) times daily.  cholecalciferol, vitamin D3, 2,000 unit tab Take 5,000 Int'l Units by mouth two (2) times a day.  biotin 2,500 mcg tab Take 10,000 mcg by mouth two (2) times a day.  cranberry extract (AZO CRANBERRY) 450 mg tab Take 8,400 mg by mouth two (2) times a day.  pyridoxine, vitamin B6, (VITAMIN B-6) 100 mg tablet Take 100 mg by mouth daily.  cinnamon bark 500 mg cap Take 1,000 mg by mouth two (2) times a day.  cyanocobalamin 1,000 mcg tablet Take 1,000 mcg by mouth daily.  calcium-magnesium-zinc 333-133-5 mg tab Take  by mouth two (2) times a day.      No current facility-administered medications for this visit. PHYSICAL EXAMINATION:  Visit Vitals  Pulse (!) 111   Ht 5' 3\" (1.6 m)   Wt 205 lb (93 kg)   SpO2 96%   BMI 36.31 kg/m²      ORTHO EXAMINATION:  Examination Right knee Left knee   Skin Intact Intact   Range of motion 95-5 120-0   Effusion - -   Medial joint line tenderness + -   Lateral joint line tenderness - -   Popliteal tenderness - -   Osteophytes palpable + +   Ges - -   Patella crepitus + -   Anterior drawer - -   Lateral laxity - -   Medial laxity - -   Varus deformity - -   Valgus deformity - -   Pretibial edema - -   Calf tenderness - -      using rollator walker    TIME OUT:  Chart reviewed for the following:   I, Tina Fernando MD, have reviewed the History, Physical and updated the Allergic reactions for [facility] performed immediately prior to start of procedure:  Daniella Abel MD, have performed the following reviews on [facility] prior to the start of the procedure:          * Patient was identified by name and date of birth   * Agreement on procedure being performed was verified  * Risks and Benefits explained to the patient  * Procedure site verified and marked as necessary  * Patient was positioned for comfort  * Consent was obtained     Time: 3:24 PM     Date of procedure: 11/12/2021  Procedure performed by:  Tina Fernando MD  Ms. Murdock tolerated the procedure well with no complications. MRI RIGHT KNEE 10/8/21  Impression  Performed by RADIOLOGY  No ligamentous or meniscal injury identified. Suspect small subchondral insufficiency fracture at the medial margin of the medial femoral condyle posteriorly with intense subcortical marrow edema. Mild/moderate tricompartmental chondrosis, most advanced at the weightbearing medial and lateral compartments. No significant joint effusion.          RADIOGRAPHS:  XR RIGHT KNEE 6/19/18 [facility]  IMPRESSION:  There is tricompartmental joint space narrowing most notable in the medial compartment. No joint effusion. Small amount of enthesopathy seen at the superior patella. No erosions identified.  -I have independently reviewed these images during this office visit. -Dr. Miguelangel Nogueira:  Three views with bilateral knees on AP view - No fractures, no effusion, moderate medial joint space narrowing, no osteophytes present. Kellgren You grade 2.       IMPRESSION:      ICD-10-CM ICD-9-CM    1. Primary osteoarthritis of right knee  M17.11 715.16 PROCEDURE AUTHORIZATION TO       betamethasone (CELESTONE) injection 3 mg      DRAIN/INJECT LARGE JOINT/BURSA      AMB SUPPLY ORDER   2. Chronic pain of right knee  M25.561 719.46 PROCEDURE AUTHORIZATION TO     G89.29 338.29 betamethasone (CELESTONE) injection 3 mg      DRAIN/INJECT LARGE JOINT/BURSA      AMB SUPPLY ORDER   3. Subchondral insufficiency fracture of condyle of right femur, initial encounter ([facility])  M84.451A 733.15      PLAN: Consider visco supplementation if pain continues. New rollator Walker Rx provided. After discussing treatment options, patient's right knee was injected with 4 cc Marcaine and 1/2 cc Celestone. There is no need for surgery at this time. She will follow up as needed.       Scribed by Satish Saab MD [address]) as dictated by Satish Saab MD

## 2021-11-18 ENCOUNTER — DOCUMENTATION ONLY (OUTPATIENT)
Dept: ORTHOPEDIC SURGERY | Age: 51
End: 2021-11-18

## 2021-11-18 NOTE — PROGRESS NOTES
Received fax from [address] [place] [address] stating patient has other insurance primary to [address] [place] [address], we only have  in system.  Awb 11/18/21    Faxed form and records to [address] [place] [address] requesting auth for Euflexxa right knee awb 11/15/21

## 2021-11-18 NOTE — PATIENT DISCUSSION
10/19/21: [facility] BID AND PROLENSA. CPM BY DR Sandi Montoya. CME HAS RESOLVED, ONLY SUBCLINICAL APPEARANCE ON FA. Recommend TO CONT TOPICAL NON-STEROID ANTI-INFLAMMATORY therapy. F/U IN 1 MO OR SOONER PRN.

## 2021-11-18 NOTE — PATIENT DISCUSSION
11/18/21: Recommend TOPICAL STEROID AND NON-STEROID ANTI-INFLAMMATORY therapy. DISCUSSED THE LIKELIHOOD OF DOUGLAS LAURA COMPONENT. CONT TO MONITOR FOR CHANGES.

## 2021-12-27 ENCOUNTER — OFFICE VISIT (OUTPATIENT)
Dept: ORTHOPEDIC SURGERY | Age: 51
End: 2021-12-27
Payer: OTHER GOVERNMENT

## 2021-12-27 VITALS
BODY MASS INDEX: 36.32 KG/M2 | HEART RATE: 102 BPM | HEIGHT: 63 IN | OXYGEN SATURATION: 95 % | TEMPERATURE: 98.9 F | WEIGHT: 205 LBS

## 2021-12-27 DIAGNOSIS — M25.552 LEFT HIP PAIN: ICD-10-CM

## 2021-12-27 DIAGNOSIS — G89.29 CHRONIC PAIN OF RIGHT KNEE: ICD-10-CM

## 2021-12-27 DIAGNOSIS — M25.561 CHRONIC PAIN OF RIGHT KNEE: ICD-10-CM

## 2021-12-27 DIAGNOSIS — M17.11 PRIMARY OSTEOARTHRITIS OF RIGHT KNEE: Primary | ICD-10-CM

## 2021-12-27 PROCEDURE — 99213 OFFICE O/P EST LOW 20 MIN: CPT | Performed by: SPECIALIST

## 2021-12-27 PROCEDURE — 73502 X-RAY EXAM HIP UNI 2-3 VIEWS: CPT | Performed by: SPECIALIST

## 2021-12-27 PROCEDURE — 20610 DRAIN/INJ JOINT/BURSA W/O US: CPT | Performed by: SPECIALIST

## 2021-12-27 RX ORDER — BETAMETHASONE SODIUM PHOSPHATE AND BETAMETHASONE ACETATE 3; 3 MG/ML; MG/ML
3 INJECTION, SUSPENSION INTRA-ARTICULAR; INTRALESIONAL; INTRAMUSCULAR; SOFT TISSUE ONCE
Status: COMPLETED | OUTPATIENT
Start: 2021-12-27 | End: 2021-12-27

## 2021-12-27 RX ADMIN — BETAMETHASONE SODIUM PHOSPHATE AND BETAMETHASONE ACETATE 3 MG: 3; 3 INJECTION, SUSPENSION INTRA-ARTICULAR; INTRALESIONAL; INTRAMUSCULAR; SOFT TISSUE at 10:33

## 2021-12-27 NOTE — PROGRESS NOTES
Patient: Katerina Sheikh                MRN: [id number]       SSN: [id number]  YOB: 1970        AGE: 46 y.o. SEX: female    PCP: Ioana Mooney MD  01/03/22    CC: BILATERAL KNEE, R>L AND LEFT HIP PAIN    HISTORY:  Katerina Sheikh is a 46 y.o. female who is seen for bilateral knee pain, R>L. She also has left hip pain. Left hip XR taken about 4 years ago at Dr. Eliecer Holden' office reportedly revealed hip arthritis. She states that she has noticed her left hip poking out more. Driving has been more difficult due to her right knee pain so she has to drive with the side of her foot. She was previously seen by Dr. Janalee Lesches and Dr. Monster Adhikari. She had a MRI but she says Dr. Monster Adhikari never called her about the results and told her there was nothing wrong with her knee. She now presents for a second orthopedic opinion. She has been experiencing right knee pain for the past several months. She felt like her knee buckled on her last night which caused her to fall to the ground. She feels excruciating pain when she tries bearing weight. She feels pain with standing, walking and stair climbing. She experiences startup pain after sitting. She say's she can't walk due to her pain and Parkinson's disease. She has a h/o Parkinson's. She says she has trouble maintaining her balance. Pain Assessment  12/27/2021   Location of Pain Knee   Location Modifiers Right   Severity of Pain 9   Quality of Pain Sharp; Other (Comment); Throbbing   Quality of Pain Comment Pressure   Duration of Pain Persistent   Frequency of Pain Constant   Date Pain First Started -   Date Pain First Started Comment -   Aggravating Factors Standing;Walking   Limiting Behavior Yes   Relieving Factors Nothing   Result of Injury No     Occupation, etc:  Ms. Giovanni Negron is not employed. She is a housewife. She previously worked a  and  years ago. She takes care of her grandson.  She has to drive him 40 miles to his private school in [place] everyday. She lives with her  in [place]. She has 3 daughters and 4 grandsons (4-7 yo). Ms. Ruma De La Cruz weighs 205 lbs and is 5'3\" tall. She has lost 100 pounds on diabetic meds. She is an insulin dependent diabetic.     Lab Results   Component Value Date/Time    Hemoglobin A1c 7.9 (H) 05/15/2018 07:19 PM     Weight Metrics 12/27/2021 11/12/2021 6/24/2019 12/28/2018 10/16/2017 8/17/2017   Weight 205 lb 205 lb 205 lb 205 lb 192 lb 200 lb   BMI 36.31 kg/m2 36.31 kg/m2 36.31 kg/m2 36.31 kg/m2 32.96 kg/m2 35.43 kg/m2       Patient Active Problem List   Diagnosis Code    Acute pancreatitis K85.90    Nausea and vomiting R11.2     REVIEW OF SYSTEMS:    Constitutional Symptoms: Negative   Eyes: Negative   Ears, Nose, Throat and Mouth: Negative   Cardiovascular: Negative   Respiratory: Negative   Genitourinary: Per HPI   Gastrointestinal: Per HPI   Integumentary (Skin and/or Breast): Negative   Musculoskeletal: Per HPI   Endocrine/Rheumatologic: Negative   Neurological: Per HPI   Hematology/Lymphatic: Negative    Allergic/Immunologic: Negative   Phychiatric: Negative    Social History     Socioeconomic History    Marital status:      Spouse name: Not on file    Number of children: Not on file    Years of education: Not on file    Highest education level: Not on file   Occupational History    Not on file   Tobacco Use    Smoking status: Current Every Day Smoker     Packs/day: 0.50    Smokeless tobacco: Never Used   Substance and Sexual Activity    Alcohol use: No    Drug use: No    Sexual activity: Not on file   Other Topics Concern    Not on file   Social History Narrative    Not on file     Social Determinants of Health     Financial Resource Strain:     Difficulty of Paying Living Expenses: Not on file   Food Insecurity:     Worried About Running Out of Food in the Last Year: Not on file    Gab of Food in the Last Year: Not on file   Transportation Needs:     Lack of Transportation (Medical): Not on file    Lack of Transportation (Non-Medical): Not on file   Physical Activity:     Days of Exercise per Week: Not on file    Minutes of Exercise per Session: Not on file   Stress:     Feeling of Stress : Not on file   Social Connections:     Frequency of Communication with Friends and Family: Not on file    Frequency of Social Gatherings with Friends and Family: Not on file    Attends Taoist Services: Not on file    Active Member of [address] [place] [address] or Organizations: Not on file    Attends Club or Organization Meetings: Not on file    Marital Status: Not on file   Intimate Partner Violence:     Fear of Current or Ex-Partner: Not on file    Emotionally Abused: Not on file    Physically Abused: Not on file    Sexually Abused: Not on file   Housing Stability:     Unable to Pay for Housing in the Last Year: Not on file    Number of [place] in the Last Year: Not on file    Unstable Housing in the Last Year: Not on file      Allergies   Allergen Reactions    Pcn [Penicillins] Anaphylaxis    Biaxin [Clarithromycin] Other (comments)     Abdominal pain, dark urine    Brethine [Terbutaline] Other (comments)     Severe haalucinations    Gabapentin Other (comments)     Sates made nerve pain excruciating        Ibuprofen Unknown (comments)     Pancreatitis    Lyrica [Pregabalin] Angioedema    Nsaids (Non-Steroidal Anti-Inflammatory Drug) Other (comments)     Pancreatitis    Skelaxin [Metaxalone] Other (comments)     Makes her severely weak      Statins-Hmg-Coa Reductase Inhibitors Other (comments)     Pain muscle weakness        Current Outpatient Medications   Medication Sig    omeprazole (PRILOSEC) 40 mg capsule Take 40 mg by mouth daily.  dicyclomine (BENTYL) 10 mg capsule Take 10 mg by mouth 4 times daily (before meals and nightly).  pramipexole (MIRAPEX) 0.25 mg tablet Take 0.5 mg by mouth three (3) times daily.     traZODone (DESYREL) 50 mg tablet Take 100 mg by mouth nightly.  valACYclovir (Valtrex) 1 gram tablet Take  by mouth.  augmented betamethasone dipropionate (DIPROLENE-AF) 0.05 % ointment Apply  to affected area two (2) times a day.  sucralfate (Carafate) 100 mg/mL suspension Take 5 mL by mouth four (4) times daily.  methocarbamoL (Robaxin) 500 mg tablet Take 1 Tab by mouth four (4) times daily. As needed for muscle pain or spasm    promethazine (PHENERGAN) 25 mg tablet Take 1 Tab by mouth every six (6) hours as needed for Nausea for up to 12 doses.  hydroxychloroquine (PLAQUENIL) 200 mg tablet Take 200 mg by mouth two (2) times a day.  insulin glargine (LANTUS) 100 unit/mL injection 30 Units by SubCUTAneous route daily.  insulin lispro (HUMALOG) 100 unit/mL injection 5 Units by SubCUTAneous route Before breakfast, lunch, and dinner. Indications: Gestational Diabetes Mellitus, for \"high blood sugar\"    metFORMIN (GLUCOPHAGE) 500 mg tablet Take 1,000 mg by mouth two (2) times daily (with meals).  levothyroxine (SYNTHROID) 75 mcg tablet Take  by mouth Daily (before breakfast).  perindopril (ACEON) 4 mg tablet Take 4 mg by mouth daily. Indications: HYPERTENSION    amitriptyline (ELAVIL) 25 mg tablet Take 75 mg by mouth nightly.  montelukast (SINGULAIR) 10 mg tablet Take 10 mg by mouth daily.  baclofen (LIORESAL) 10 mg tablet Take 10 mg by mouth three (3) times daily.  Venlafaxine 150 mg tr24 Take  by mouth.  benzonatate (TESSALON) 200 mg capsule Take 200 mg by mouth three (3) times daily as needed for Cough.  tiotropium (SPIRIVA) 18 mcg inhalation capsule Take 1 Cap by inhalation daily.  albuterol (PROVENTIL VENTOLIN) 2.5 mg /3 mL (0.083 %) nebulizer solution Take  by inhalation every four (4) hours as needed for Wheezing.  clonazePAM (KLONOPIN) 0.5 mg tablet Take 0.5 mg by mouth three (3) times daily.  cholecalciferol, vitamin D3, 2,000 unit tab Take 5,000 Int'l Units by mouth two (2) times a day.     biotin 2,500 mcg tab Take 10,000 mcg by mouth two (2) times a day.  cranberry extract (AZO CRANBERRY) 450 mg tab Take 8,400 mg by mouth two (2) times a day.  pyridoxine, vitamin B6, (VITAMIN B-6) 100 mg tablet Take 100 mg by mouth daily.  cinnamon bark 500 mg cap Take 1,000 mg by mouth two (2) times a day.  cyanocobalamin 1,000 mcg tablet Take 1,000 mcg by mouth daily.  calcium-magnesium-zinc 333-133-5 mg tab Take  by mouth two (2) times a day. No current facility-administered medications for this visit.       PHYSICAL EXAMINATION:  Visit Vitals  Pulse (!) 102   Temp 98.9 °F (37.2 °C) (Temporal)   Ht 5' 3\" (1.6 m)   Wt 205 lb (93 kg)   SpO2 95%   BMI 36.31 kg/m²        ORTHO EXAMINATION:    Examination Right knee Left knee   Skin Intact Intact   Range of motion 95-5 120-0   Effusion - -   Medial joint line tenderness + -   Lateral joint line tenderness - -   Popliteal tenderness - -   Osteophytes palpable + +   Ges - -   Patella crepitus + -   Anterior drawer - -   Lateral laxity - -   Medial laxity - -   Varus deformity - -   Valgus deformity - -   Pretibial edema - -   Calf tenderness - -      using rollator walker  Examination Right hip Left hip   Skin Intact Intact   External Rotation ROM 10 10   Internal Rotation ROM 10 10   Trochanteric tenderness - -   Hip flexion contracture + +   Antalgic gait + +   Trendelenberg sign - -   Lumbar tenderness - -   Straight leg raise - -   Calf tenderness - -   Neurovascular Intact Intact     TIME OUT:  Chart reviewed for the following:   IJohn MD, have reviewed the History, Physical and updated the Allergic reactions for [facility] performed immediately prior to start of procedure:  Livan Vila MD, have performed the following reviews on Kerwin Pang prior to the start of the procedure:          * Patient was identified by name and date of birth   * Agreement on procedure being performed was verified  * Risks and Benefits explained to the patient  * Procedure site verified and marked as necessary  * Patient was positioned for comfort  * Consent was obtained     Time: 10:26 AM    Date of procedure: 1/3/2022  Procedure performed by:  Jos Freed MD  Ms. Murdock tolerated the procedure well with no complications. MRI RIGHT KNEE 10/8/21  Impression  Performed by RADIOLOGY  No ligamentous or meniscal injury identified. Suspect small subchondral insufficiency fracture at the medial margin of the medial femoral condyle posteriorly with intense subcortical marrow edema. Mild/moderate tricompartmental chondrosis, most advanced at the weightbearing medial and lateral compartments. No significant joint effusion. RADIOGRAPHS:  LEFT HIP XR 12/27/21 DAVID:  IMPRESSION:  AP pelvis and two views - No fractures, moderately severe joint space narrowing, small lateral acetabular osteophytes present. Tonnis grade 3. Narrowing of pubic symphysis. XR RIGHT KNEE 6/19/18 [facility]  IMPRESSION:  There is tricompartmental joint space narrowing most notable in the medial compartment. No joint effusion. Small amount of enthesopathy seen at the superior patella. No erosions identified.  -I have independently reviewed these images during this office visit. -Dr. Johnson Common:  Three views with bilateral knees on AP view - No fractures, no effusion, moderate medial joint space narrowing, no osteophytes present. Kellgren You grade 2.       IMPRESSION:      ICD-10-CM ICD-9-CM    1. Primary osteoarthritis of right knee  M17.11 715.16 LA DRAIN/INJECT LARGE JOINT/BURSA      sodium hyaluronate (SUPARTZ FX/EUFLEXXA/HYALGAN) 10 mg/mL injection syrg 20 mg   2. Chronic pain of right knee  M25.561 719.46 LA DRAIN/INJECT LARGE JOINT/BURSA    G89.29 338.29 sodium hyaluronate (SUPARTZ FX/EUFLEXXA/HYALGAN) 10 mg/mL injection syrg 20 mg   3.  Left hip pain  M25.552 719.45 betamethasone (CELESTONE) injection 3 mg OR DRAIN/INJECT LARGE JOINT/BURSA      AMB POC X-RAY RADEX HIP UNI WITH PELVIS 2-3 VIEWS        PLAN: After discussing treatment options, patient's left hip was injected with 4 cc Marcaine and 1/2 cc Celestone. After discussing treatment options, patient's right knee was injected with 2 cc Euflexxa. There is no need for surgery or physical therapy. She will follow up in 1 week for Euflexxa #2.      Scribed by MD Arcenio Chance) as dictated by Lyle Phillips MD

## 2021-12-28 NOTE — PATIENT DISCUSSION
12/28/21:WE'LL SWITCH PROLENSA TO KETOROLAC TO ASSESS FOR FURTHER IMPROVEMENT.  DISCUSSED W PT THE OPTION OF INTRAVITREAL STEROID, BUT PT ELECTS TO CONT ON TOPICAL TX AND REASSESS IN 1 MO. IF NO FURTHER CHANGES IN 1 MO, I STRONGLY SUGGEST THE INTRAVITREAL TX.

## 2021-12-28 NOTE — PATIENT DISCUSSION
10/19/21: 8 [facility] BID AND PROLENSA. CPM BY DR Berta Isaac. CME HAS RESOLVED, ONLY SUBCLINICAL APPEARANCE ON FA. Recommend TO CONT TOPICAL NON-STEROID ANTI-INFLAMMATORY therapy. F/U IN 1 MO OR SOONER PRN.

## 2022-01-06 ENCOUNTER — OFFICE VISIT (OUTPATIENT)
Dept: ORTHOPEDIC SURGERY | Age: 52
End: 2022-01-06
Payer: OTHER GOVERNMENT

## 2022-01-06 VITALS
OXYGEN SATURATION: 97 % | TEMPERATURE: 97.5 F | BODY MASS INDEX: 25.52 KG/M2 | HEIGHT: 63 IN | WEIGHT: 144 LBS | HEART RATE: 105 BPM

## 2022-01-06 DIAGNOSIS — M17.11 PRIMARY OSTEOARTHRITIS OF RIGHT KNEE: Primary | ICD-10-CM

## 2022-01-06 DIAGNOSIS — M25.561 CHRONIC PAIN OF RIGHT KNEE: ICD-10-CM

## 2022-01-06 DIAGNOSIS — G89.29 CHRONIC PAIN OF RIGHT KNEE: ICD-10-CM

## 2022-01-06 PROCEDURE — 20610 DRAIN/INJ JOINT/BURSA W/O US: CPT | Performed by: SPECIALIST

## 2022-01-06 NOTE — PROGRESS NOTES
Patient: Natty Finn                MRN: [id number]       SSN: [id number]  YOB: 1970        AGE: 46 y.o. SEX: female  Body mass index is 25.51 kg/m². PCP: Milo Frances MD  01/06/22    Chief Complaint   Patient presents with    Knee Pain     Right     HISTORY:  Natty Finn is a 46 y.o. female who is seen for right knee pain. ICD-10-CM ICD-9-CM    1. Primary osteoarthritis of right knee  M17.11 715.16 sodium hyaluronate (SUPARTZ FX/EUFLEXXA/HYALGAN) 10 mg/mL injection syrg 20 mg      DRAIN/INJECT LARGE JOINT/BURSA      REFERRAL TO PAIN MANAGEMENT   2. Chronic pain of right knee  M25.561 719.46 sodium hyaluronate (SUPARTZ FX/EUFLEXXA/HYALGAN) 10 mg/mL injection syrg 20 mg    G89.29 338.29 DRAIN/INJECT LARGE JOINT/BURSA      REFERRAL TO PAIN MANAGEMENT       Chart reviewed for the following:   Joaquin Win MD, have reviewed the History, Physical and updated the Allergic reactions for [facility] performed immediately prior to start of procedure:  Joaquin Win MD, have performed the following reviews on Natty Finn prior to the start of the procedure:            * Patient was identified by name and date of birth   * Agreement on procedure being performed was verified  * Risks and Benefits explained to the patient  * Procedure site verified and marked as necessary  * Patient was positioned for comfort  * Consent was obtained     Time: 10:48 AM     Date of procedure: 1/6/2022    Procedure performed by:  Ellie Shelton MD    Ms. Murdock tolerated the procedure well with no complications. PLAN: She will start pain management. After discussing treatment options, patient's right knee was injected with 2 cc of Euflexxa. Ms. Karla Fields will follow up in one week to complete her visco supplementation injection series.       Scribed by Ellie Shelton MD [address]) as dictated by Ellie Shelton MD

## 2022-01-17 ENCOUNTER — OFFICE VISIT (OUTPATIENT)
Dept: ORTHOPEDIC SURGERY | Age: 52
End: 2022-01-17
Payer: OTHER GOVERNMENT

## 2022-01-17 VITALS
BODY MASS INDEX: 25.52 KG/M2 | HEART RATE: 95 BPM | HEIGHT: 63 IN | RESPIRATION RATE: 16 BRPM | TEMPERATURE: 97.8 F | WEIGHT: 144 LBS | OXYGEN SATURATION: 95 %

## 2022-01-17 DIAGNOSIS — G89.29 CHRONIC PAIN OF RIGHT KNEE: ICD-10-CM

## 2022-01-17 DIAGNOSIS — M17.11 PRIMARY OSTEOARTHRITIS OF RIGHT KNEE: Primary | ICD-10-CM

## 2022-01-17 DIAGNOSIS — M25.561 CHRONIC PAIN OF RIGHT KNEE: ICD-10-CM

## 2022-01-17 PROCEDURE — 20610 DRAIN/INJ JOINT/BURSA W/O US: CPT | Performed by: SPECIALIST

## 2022-01-17 NOTE — PROGRESS NOTES
Patient: Mindy Parker                MRN: [id number]       SSN: [id number]  YOB: 1970        AGE: 46 y.o. SEX: female  Body mass index is 25.51 kg/m². PCP: Rock Titus MD  01/17/22    Chief Complaint   Patient presents with    Knee Pain     right knee pain     HISTORY:  Mindy Parker is a 46 y.o. female who is seen for right knee pain. TIME OUT performed immediately prior to start of procedure:  David Kwong MD, have performed the following reviews on Mindy Parker prior to the start of the procedure:            * Patient was identified by name and date of birth   * Agreement on procedure being performed was verified  * Risks and Benefits explained to the patient  * Procedure site verified and marked as necessary  * Patient was positioned for comfort  * Consent was obtained     Time: 10:57 AM     Date of procedure: 1/17/2022    Procedure performed by:  Jose Carlos Tena MD    Ms. Murdock tolerated the procedure well with no complications      [id number] ICD-9-CM    1. Primary osteoarthritis of right knee  M17.11 715.16 sodium hyaluronate (SUPARTZ FX/EUFLEXXA/HYALGAN) 10 mg/mL injection syrg 20 mg      DRAIN/INJECT LARGE JOINT/BURSA      REFERRAL TO PAIN MANAGEMENT   2. Chronic pain of right knee  M25.561 719.46 sodium hyaluronate (SUPARTZ FX/EUFLEXXA/HYALGAN) 10 mg/mL injection syrg 20 mg    G89.29 338.29 DRAIN/INJECT LARGE JOINT/BURSA      REFERRAL TO PAIN MANAGEMENT     PLAN: She will start pain management. After discussing treatment options, patient's right knee was injected with 2 cc of Euflexxa. Ms. Sherley Ram will follow up PRN now that she has completed her visco supplementation injection series.       Scribed by MD Kt Carranza) as dictated by Jose Carlos Tena MD

## 2022-01-28 NOTE — PATIENT DISCUSSION
1/28/22: DOUGLAS GAPOST INJECTION EVALUATION, no signs of new infection, tear, RD, VF, EOM, CNS, Vascular or other problems or side effect from previous injection(s).

## 2022-01-28 NOTE — PATIENT DISCUSSION
10/19/21: 8 [facility] BID AND PROLENSA. CPM BY DR Lucas Ma. CME HAS RESOLVED, ONLY SUBCLINICAL APPEARANCE ON FA. Recommend TO CONT TOPICAL NON-STEROID ANTI-INFLAMMATORY therapy. F/U IN 1 MO OR SOONER PRN.

## 2022-01-28 NOTE — PATIENT DISCUSSION
1/28/22: WORSENING ON DROPS. CONSIDER THAT PROGREESSION IS SIGNIFICANT THAT WARRANTS SURGERY. DONT THINK PST WOULD HELP AND MAY INCREASE IOP. Florentino Reza CHECK IN 1 MO AND PROCEED W [address] 3/3/22 UNLESS IT HAS IMRPOVED. none

## 2022-04-01 NOTE — PATIENT DISCUSSION
1/28/22: WORSENING ON DROPS. CONSIDER THAT PROGREESSION IS SIGNIFICANT THAT WARRANTS SURGERY. DONT THINK PST WOULD HELP AND MAY INCREASE IOP. Siddhartha Vasquez CHECK IN 1 MO AND PROCEED [address] 3/3/22 UNLESS IT HAS IMRPOVED.

## 2022-04-01 NOTE — PATIENT DISCUSSION
10/19/21: [address] BID AND PROLENSA. CPM BY DR Johanna Kee. CME HAS RESOLVED, ONLY SUBCLINICAL APPEARANCE ON FA. Recommend TO CONT TOPICAL NON-STEROID ANTI-INFLAMMATORY therapy. F/U IN 1 MO OR SOONER PRN.

## 2022-04-01 NOTE — PATIENT DISCUSSION
4/1/22: Based on today’s exam, diagnostic studies, and review of records, and the patients functional difficulty which appear to be a result of the macular pucker, the DETERMINATION WAS MADE FOR A VITRECTOMY with membrane peel. Discussed benefits, alternatives, and risks of surgery including (but not limited to) cataract (if natural lens is still present), infection, bleeding, retinal detachment, optic neuropathy, loss of vision, blindness, and loss of eye. Patient was told the vision may not return to the same level as prior to development of the membrane but should improve as the anatomy returns to a more normal contour. No prediction of the level of visual improvement and/or the degree of distortion reduction can be given.

## 2022-04-01 NOTE — PATIENT DISCUSSION
12/28/21:WE'LL SWITCH PROLENSA TO KETOROLAC TO ASSESS FOR FURTHER IMPROVEMENT.  DISCUSSED W PT THE OPTION OF INTRAVITREAL STEROID, BUT PT ELECTS TO CONT ON TOPICAL TX AND REASSESS IN 1 MO. IF NO FURTHER CHANGES IN 1 MO, I STRONGLY SUGGEST THE INTRAVITREAL TX. Yes-Patient/Caregiver accepts free interpretation services...

## 2022-04-09 ASSESSMENT — TONOMETRY
OS_IOP_MMHG: 13
OS_IOP_MMHG: 14
OS_IOP_MMHG: 17
OD_IOP_MMHG: 17
OD_IOP_MMHG: 13
OS_IOP_MMHG: 13
OD_IOP_MMHG: 13
OD_IOP_MMHG: 14

## 2022-04-09 ASSESSMENT — VISUAL ACUITY
OD_SC: 20/30
OS_SC: 20/50
OD_SC: 20/20
OS_CC: 20/70
OS_SC: 20/30+
OD_CC: 20/70

## 2022-04-25 NOTE — PATIENT DISCUSSION
10/19/21: 8 [facility] BID AND PROLENSA. CPM BY DR Migue Harrison. CME HAS RESOLVED, ONLY SUBCLINICAL APPEARANCE ON FA. Recommend TO CONT TOPICAL NON-STEROID ANTI-INFLAMMATORY therapy. F/U IN 1 MO OR SOONER PRN.

## 2022-04-25 NOTE — PATIENT DISCUSSION
1/28/22: WORSENING ON DROPS. CONSIDER THAT PROGREESSION IS SIGNIFICANT THAT WARRANTS SURGERY. DONT THINK PST WOULD HELP AND MAY INCREASE IOP. Yaa Lugo CHECK IN 1 MO AND PROCEED [address] 3/3/22 UNLESS IT HAS IMRPOVED.

## 2022-05-04 NOTE — PATIENT DISCUSSION
1/28/22: WORSENING ON DROPS. CONSIDER THAT PROGREESSION IS SIGNIFICANT THAT WARRANTS SURGERY. DONT THINK PST WOULD HELP AND MAY INCREASE IOP. Juliann Santiago CHECK IN 1 MO AND PROCEED [address] 3/3/22 UNLESS IT HAS IMRPOVED.

## 2022-05-04 NOTE — PATIENT DISCUSSION
10/19/21: 8 [facility] BID AND PROLENSA. CPM BY DR Lisa Becker. CME HAS RESOLVED, ONLY SUBCLINICAL APPEARANCE ON FA. Recommend TO CONT TOPICAL NON-STEROID ANTI-INFLAMMATORY therapy. F/U IN 1 MO OR SOONER PRN.

## 2022-05-05 NOTE — PATIENT DISCUSSION
1/28/22: WORSENING ON DROPS. CONSIDER THAT PROGREESSION IS SIGNIFICANT THAT WARRANTS SURGERY. DONT THINK PST WOULD HELP AND MAY INCREASE IOP. Elina Diaz CHECK IN 1 MO AND PROCEED [address] 3/3/22 UNLESS IT HAS IMRPOVED.

## 2022-05-05 NOTE — PATIENT DISCUSSION
10/19/21: 8 [facility] BID AND PROLENSA. CPM BY DR Delores Stapleton. CME HAS RESOLVED, ONLY SUBCLINICAL APPEARANCE ON FA. Recommend TO CONT TOPICAL NON-STEROID ANTI-INFLAMMATORY therapy. F/U IN 1 MO OR SOONER PRN.

## 2022-05-13 NOTE — PATIENT DISCUSSION
10/19/21: 8 [facility] BID AND PROLENSA. CPM BY DR Lucas Ma. CME HAS RESOLVED, ONLY SUBCLINICAL APPEARANCE ON FA. Recommend TO CONT TOPICAL NON-STEROID ANTI-INFLAMMATORY therapy. F/U IN 1 MO OR SOONER PRN.

## 2022-06-03 NOTE — PATIENT DISCUSSION
1/28/22: WORSENING ON DROPS. CONSIDER THAT PROGREESSION IS SIGNIFICANT THAT WARRANTS SURGERY. DONT THINK PST WOULD HELP AND MAY INCREASE IOP. Galilea Wharton CHECK IN 1 MO AND PROCEED [address] 3/3/22 UNLESS IT HAS IMRPOVED.

## 2022-06-03 NOTE — PATIENT DISCUSSION
10/19/21: 8 [facility] BID AND PROLENSA. CPM BY DR Almodovar Dub 37. CME HAS RESOLVED, ONLY SUBCLINICAL APPEARANCE ON FA. Recommend TO CONT TOPICAL NON-STEROID ANTI-INFLAMMATORY therapy. F/U IN 1 MO OR SOONER PRN.

## 2022-08-05 NOTE — PATIENT DISCUSSION
5/13/22: DOING WELL. VISION IMPROVING. RESTRICTION FOR ANOTHER WK. F/U IN 3 WKS OR SOONER PRN. Olga Suggs 42 ([phone]). RN to receive Pt was unavailable, gave report to [address]  While giving report CM, advised that [facility] called and expected time to  patient is 1600. Informed daughter Ashley Aceves, and Pt.    [address] [facility]  [facility] arrived. Removed Pt IV. jose martine [facility] pertinent Pr details. 1730  Pt off the floor. Daughter Ashley Aceves ([phone]) notified.

## 2022-08-05 NOTE — PATIENT DISCUSSION
10/19/21: 8 [facility] BID AND PROLENSA. CPM BY DR Susy Benavidez. CME HAS RESOLVED, ONLY SUBCLINICAL APPEARANCE ON FA. Recommend TO CONT TOPICAL NON-STEROID ANTI-INFLAMMATORY therapy. F/U IN 1 MO OR SOONER PRN.

## 2022-08-05 NOTE — PATIENT DISCUSSION
1/28/22: WORSENING ON DROPS. CONSIDER THAT PROGREESSION IS SIGNIFICANT THAT WARRANTS SURGERY. DONT THINK PST WOULD HELP AND MAY INCREASE IOP. Norris Galdamez CHECK IN 1 MO AND PROCEED W [facility] 3/3/22 UNLESS IT HAS IMRPOVED.

## 2022-12-09 NOTE — PATIENT DISCUSSION
10/19/21: 8 [facility] BID AND PROLENSA. CPM BY DR Almodovar Dub 37. CME HAS RESOLVED, ONLY SUBCLINICAL APPEARANCE ON FA. Recommend TO CONT TOPICAL NON-STEROID ANTI-INFLAMMATORY therapy. F/U IN 1 MO OR SOONER PRN.

## 2022-12-09 NOTE — PATIENT DISCUSSION
1/28/22: WORSENING ON DROPS. CONSIDER THAT PROGREESSION IS SIGNIFICANT THAT WARRANTS SURGERY. DONT THINK PST WOULD HELP AND MAY INCREASE IOP. Janki Last CHECK IN 1 MO AND PROCEED [address] 3/3/22 UNLESS IT HAS IMRPOVED.

## 2023-01-20 NOTE — PATIENT DISCUSSION
1/28/22: WORSENING ON DROPS. CONSIDER THAT PROGREESSION IS SIGNIFICANT THAT WARRANTS SURGERY. DONT THINK PST WOULD HELP AND MAY INCREASE IOP. Essie Penn CHECK IN 1 MO AND PROCEED [address] 3/3/22 UNLESS IT HAS IMRPOVED.

## 2023-01-20 NOTE — PATIENT DISCUSSION
10/19/21: 8 [facility] BID AND PROLENSA. CPM BY DR Dawson Proctor. CME HAS RESOLVED, ONLY SUBCLINICAL APPEARANCE ON FA. Recommend TO CONT TOPICAL NON-STEROID ANTI-INFLAMMATORY therapy. F/U IN 1 MO OR SOONER PRN.

## (undated) DEVICE — FLEX ADVANTAGE 1500CC: Brand: FLEX ADVANTAGE

## (undated) DEVICE — BITE BLK ENDOSCP AD 54FR GRN POLYETH ENDOSCP W STRP SLD

## (undated) DEVICE — SYR 50ML SLIP TIP NSAF LF STRL --

## (undated) DEVICE — BASIN EMESIS 500CC ROSE 250/CS 60/PLT: Brand: MEDEGEN MEDICAL PRODUCTS, LLC

## (undated) DEVICE — CONTAINER PREFIL FRMLN 15ML --

## (undated) DEVICE — KENDALL 500 SERIES DIAPHORETIC FOAM MONITORING ELECTRODE - TEAR DROP SHAPE ( 30/PK): Brand: KENDALL

## (undated) DEVICE — MEDI-VAC NON-CONDUCTIVE SUCTION TUBING: Brand: CARDINAL HEALTH

## (undated) DEVICE — KIT COLON W/ 1.1OZ LUB AND 2 END

## (undated) DEVICE — FORCEPS BX L240CM JAW DIA2.8MM L CAP W/ NDL MIC MESH TOOTH